# Patient Record
Sex: FEMALE | Race: WHITE | NOT HISPANIC OR LATINO | Employment: FULL TIME | URBAN - METROPOLITAN AREA
[De-identification: names, ages, dates, MRNs, and addresses within clinical notes are randomized per-mention and may not be internally consistent; named-entity substitution may affect disease eponyms.]

---

## 2023-06-22 ENCOUNTER — HOSPITAL ENCOUNTER (EMERGENCY)
Facility: HOSPITAL | Age: 63
Discharge: HOME | End: 2023-06-22
Attending: EMERGENCY MEDICINE
Payer: COMMERCIAL

## 2023-06-22 VITALS
DIASTOLIC BLOOD PRESSURE: 89 MMHG | TEMPERATURE: 99 F | HEART RATE: 112 BPM | WEIGHT: 206.13 LBS | HEIGHT: 65 IN | RESPIRATION RATE: 18 BRPM | SYSTOLIC BLOOD PRESSURE: 167 MMHG | BODY MASS INDEX: 34.34 KG/M2 | OXYGEN SATURATION: 96 %

## 2023-06-22 DIAGNOSIS — S01.01XA LACERATION OF SCALP, INITIAL ENCOUNTER: Primary | ICD-10-CM

## 2023-06-22 PROCEDURE — 63600175 PHARM REV CODE 636 W HCPCS: Mod: ER | Performed by: NURSE PRACTITIONER

## 2023-06-22 PROCEDURE — 12005 RPR S/N/A/GEN/TRK12.6-20.0CM: CPT | Mod: ER

## 2023-06-22 PROCEDURE — 90471 IMMUNIZATION ADMIN: CPT | Mod: ER | Performed by: NURSE PRACTITIONER

## 2023-06-22 PROCEDURE — 25000003 PHARM REV CODE 250: Mod: ER | Performed by: NURSE PRACTITIONER

## 2023-06-22 PROCEDURE — 90714 TD VACC NO PRESV 7 YRS+ IM: CPT | Mod: ER | Performed by: NURSE PRACTITIONER

## 2023-06-22 PROCEDURE — 99283 EMERGENCY DEPT VISIT LOW MDM: CPT | Mod: 25,ER

## 2023-06-22 RX ADMIN — Medication: at 05:06

## 2023-06-22 RX ADMIN — CLOSTRIDIUM TETANI TOXOID ANTIGEN (FORMALDEHYDE INACTIVATED) AND CORYNEBACTERIUM DIPHTHERIAE TOXOID ANTIGEN (FORMALDEHYDE INACTIVATED) 0.5 ML: 5; 2 INJECTION, SUSPENSION INTRAMUSCULAR at 05:06

## 2023-06-22 NOTE — ED PROVIDER NOTES
Encounter Date: 6/22/2023       History     Chief Complaint   Patient presents with    Laceration     Fell and hit head on garbage can. Laceration on top of head. Denies LOC.      Patient complains of scalp laceration after a trip and fall hitting her head on a garbage can lid denies LOC is not blood thinners.      Review of patient's allergies indicates:  No Known Allergies  No past medical history on file.  No past surgical history on file.  No family history on file.     Review of Systems   Constitutional:  Negative for fever.   HENT:  Negative for sore throat.    Respiratory:  Negative for shortness of breath.    Cardiovascular:  Negative for chest pain.   Gastrointestinal:  Negative for nausea.   Genitourinary:  Negative for dysuria.   Musculoskeletal:  Negative for back pain.   Skin:  Negative for rash.   Neurological:  Negative for weakness.   Hematological:  Does not bruise/bleed easily.     Physical Exam     Initial Vitals [06/22/23 1710]   BP Pulse Resp Temp SpO2   (!) 167/89 (!) 112 18 98.5 °F (36.9 °C) 96 %      MAP       --         Physical Exam    Nursing note and vitals reviewed.  Constitutional: She appears well-developed and well-nourished. She is not diaphoretic. She is active.  Non-toxic appearance. No distress.   HENT:   Head: Normocephalic and atraumatic.   Eyes: Conjunctivae are normal. Right eye exhibits no discharge. Left eye exhibits no discharge. No scleral icterus.   Neck:   Normal range of motion.  Cardiovascular:  Normal rate, regular rhythm and intact distal pulses.           No murmur heard.  Pulmonary/Chest: Breath sounds normal. No respiratory distress. She has no wheezes.   Abdominal: She exhibits no distension.   Musculoskeletal:         General: No tenderness. Normal range of motion.      Cervical back: Normal range of motion.     Neurological: She is alert and oriented to person, place, and time. No cranial nerve deficit. GCS score is 15. GCS eye subscore is 4. GCS verbal  subscore is 5. GCS motor subscore is 6.   Skin: Skin is warm and dry. Capillary refill takes less than 2 seconds. No rash noted.   Frontal scalp horizontal arching 15 cm laceration   Psychiatric: She has a normal mood and affect. Her behavior is normal. Judgment and thought content normal.       ED Course   Lac Repair    Date/Time: 6/22/2023 6:12 PM  Performed by: David Palma NP  Authorized by: Pau Laureano DO     Consent:     Consent obtained:  Verbal    Consent given by:  Patient    Risks discussed:  Infection, need for additional repair and poor cosmetic result  Universal protocol:     Patient identity confirmed:  Verbally with patient  Anesthesia:     Anesthesia method:  Topical application    Topical anesthetic:  LET  Laceration details:     Location:  Scalp    Length (cm):  16    Depth (mm):  10  Exploration:     Wound extent: areolar tissue violated and fascia violated      Wound extent: no foreign bodies/material noted, no muscle damage noted, no nerve damage noted, no tendon damage noted, no underlying fracture noted and no vascular damage noted    Treatment:     Amount of cleaning:  Standard    Irrigation solution:  Sterile saline    Irrigation method:  Pressure wash  Skin repair:     Repair method:  Staples    Number of staples:  15  Approximation:     Approximation:  Close  Repair type:     Repair type:  Simple  Post-procedure details:     Dressing:  Bulky dressing    Procedure completion:  Tolerated well, no immediate complications  Labs Reviewed - No data to display       Imaging Results    None          Medications   LETS (LIDOcaine-TETRAcaine-EPINEPHrine) gel solution ( Topical (Top) Given 6/22/23 1731)   LETS (LIDOcaine-TETRAcaine-EPINEPHrine) gel solution ( Topical (Top) Given 6/22/23 1730)   tetanus and diphther. tox (PF)(TD) (0.5 mLs Intramuscular Given 6/22/23 1733)                              Clinical Impression:   Final diagnoses:  [S01.01XA] Laceration of scalp, initial  encounter (Primary)        ED Disposition Condition    Discharge Stable          ED Prescriptions    None       Follow-up Information       Follow up With Specialties Details Why Contact Info    pcp                 David Palma NP  06/22/23 6050